# Patient Record
Sex: FEMALE | Race: OTHER | Employment: OTHER | URBAN - METROPOLITAN AREA
[De-identification: names, ages, dates, MRNs, and addresses within clinical notes are randomized per-mention and may not be internally consistent; named-entity substitution may affect disease eponyms.]

---

## 2020-12-03 NOTE — PATIENT DISCUSSION
Hyperopia Counseling: The diagnosis of hyperopia (farsightedness) was explained to the patient. Options for the correction of the patient's hyperopia which may include glasses, contacts or elective refractive surgery were discussed. Return for follow-up as scheduled. No

## 2021-06-23 ENCOUNTER — CATARACT EVALUATION (OUTPATIENT)
Dept: URBAN - METROPOLITAN AREA CLINIC 39 | Facility: CLINIC | Age: 61
End: 2021-06-23

## 2021-06-23 DIAGNOSIS — H25.811: ICD-10-CM

## 2021-06-23 DIAGNOSIS — H25.812: ICD-10-CM

## 2021-06-23 DIAGNOSIS — H40.1130: ICD-10-CM

## 2021-06-23 DIAGNOSIS — H18.513: ICD-10-CM

## 2021-06-23 PROCEDURE — 99204 OFFICE O/P NEW MOD 45 MIN: CPT

## 2021-06-23 PROCEDURE — 92250 FUNDUS PHOTOGRAPHY W/I&R: CPT

## 2021-06-23 PROCEDURE — 92136TC INTERFEROMETRY - TECHNICAL COMPONENT

## 2021-06-23 PROCEDURE — 92025-2 CORNEAL TOPOGRAPHY, PT

## 2021-06-23 PROCEDURE — 92134 CPTRZ OPH DX IMG PST SGM RTA: CPT

## 2021-06-23 PROCEDURE — V2799PMN IMPRIMIS PRED-MOXI-NEPAF 5ML

## 2021-06-23 PROCEDURE — 92286 ANT SGM IMG I&R SPECLR MIC: CPT

## 2021-06-23 PROCEDURE — 92133 CPTRZD OPH DX IMG PST SGM ON: CPT

## 2021-06-23 ASSESSMENT — VISUAL ACUITY
OD_SC: J4
OS_CC: J1
OD_CC: J1
OS_SC: J6
OD_SC: 20/50-1
OS_SC: 20/25-1

## 2021-06-23 ASSESSMENT — TONOMETRY
OD_IOP_MMHG: 12
OS_IOP_MMHG: 14

## 2021-07-15 ENCOUNTER — SURGERY/PROCEDURE (OUTPATIENT)
Dept: URBAN - METROPOLITAN AREA CLINIC 39 | Facility: CLINIC | Age: 61
End: 2021-07-15

## 2021-07-15 ENCOUNTER — ESTABLISHED PATIENT (OUTPATIENT)
Dept: URBAN - METROPOLITAN AREA SURGERY 14 | Facility: SURGERY | Age: 61
End: 2021-07-15

## 2021-07-15 VITALS
HEART RATE: 78 BPM | DIASTOLIC BLOOD PRESSURE: 88 MMHG | RESPIRATION RATE: 15 BRPM | SYSTOLIC BLOOD PRESSURE: 144 MMHG | HEIGHT: 55 IN

## 2021-07-15 DIAGNOSIS — H25.812: ICD-10-CM

## 2021-07-15 DIAGNOSIS — H25.811: ICD-10-CM

## 2021-07-15 PROCEDURE — 65772LRI LRI DURING CAT SX

## 2021-07-15 PROCEDURE — 99211HP H&P OFFICE/OUTPATIENT VISIT, EST

## 2021-07-15 PROCEDURE — 66999LNSR LENSAR LASER FOR CAT SX

## 2021-07-15 PROCEDURE — 66984AV REMOVE CATARACT, INSERT ADVANCED LENS

## 2021-07-16 ENCOUNTER — CATARACT POST-OP 1-DAY (OUTPATIENT)
Dept: URBAN - METROPOLITAN AREA CLINIC 39 | Facility: CLINIC | Age: 61
End: 2021-07-16

## 2021-07-16 DIAGNOSIS — Z96.1: ICD-10-CM

## 2021-07-16 PROCEDURE — 99024 POSTOP FOLLOW-UP VISIT: CPT

## 2021-07-16 RX ORDER — MINERAL OIL 2; 2 MG/.4ML; MG/.4ML: 1 EMULSION OPHTHALMIC

## 2021-07-16 ASSESSMENT — TONOMETRY
OD_IOP_MMHG: 17
OS_IOP_MMHG: 16

## 2021-07-16 ASSESSMENT — VISUAL ACUITY
OS_SC: 20/25+2
OD_SC: J1
OD_SC: 20/25-2
OS_SC: J3

## 2021-07-19 ENCOUNTER — POST OP/EVAL OF SECOND EYE (OUTPATIENT)
Dept: URBAN - METROPOLITAN AREA CLINIC 39 | Facility: CLINIC | Age: 61
End: 2021-07-19

## 2021-07-19 DIAGNOSIS — H40.1130: ICD-10-CM

## 2021-07-19 DIAGNOSIS — Z96.1: ICD-10-CM

## 2021-07-19 DIAGNOSIS — Z98.890: ICD-10-CM

## 2021-07-19 DIAGNOSIS — H25.812: ICD-10-CM

## 2021-07-19 DIAGNOSIS — H18.513: ICD-10-CM

## 2021-07-19 PROCEDURE — 99024 POSTOP FOLLOW-UP VISIT: CPT

## 2021-07-19 PROCEDURE — 92012 INTRM OPH EXAM EST PATIENT: CPT

## 2021-07-19 ASSESSMENT — VISUAL ACUITY
OD_SC: 20/20-1
OS_SC: 20/25-1
OS_SC: J6
OD_SC: J1+

## 2021-07-19 ASSESSMENT — TONOMETRY
OS_IOP_MMHG: 14
OD_IOP_MMHG: 14

## 2021-07-22 ENCOUNTER — FOLLOW UP (OUTPATIENT)
Dept: URBAN - METROPOLITAN AREA CLINIC 39 | Facility: CLINIC | Age: 61
End: 2021-07-22

## 2021-07-22 ENCOUNTER — SURGERY/PROCEDURE (OUTPATIENT)
Dept: URBAN - METROPOLITAN AREA CLINIC 39 | Facility: CLINIC | Age: 61
End: 2021-07-22

## 2021-07-22 ENCOUNTER — ESTABLISHED PATIENT (OUTPATIENT)
Dept: URBAN - METROPOLITAN AREA SURGERY 14 | Facility: SURGERY | Age: 61
End: 2021-07-22

## 2021-07-22 VITALS
HEIGHT: 55 IN | DIASTOLIC BLOOD PRESSURE: 88 MMHG | HEART RATE: 78 BPM | SYSTOLIC BLOOD PRESSURE: 144 MMHG | RESPIRATION RATE: 15 BRPM

## 2021-07-22 DIAGNOSIS — H25.812: ICD-10-CM

## 2021-07-22 DIAGNOSIS — H40.1130: ICD-10-CM

## 2021-07-22 DIAGNOSIS — H18.513: ICD-10-CM

## 2021-07-22 DIAGNOSIS — Z96.1: ICD-10-CM

## 2021-07-22 DIAGNOSIS — Z98.890: ICD-10-CM

## 2021-07-22 PROCEDURE — 99211HP H&P OFFICE/OUTPATIENT VISIT, EST

## 2021-07-22 PROCEDURE — 65772LRI LRI DURING CAT SX

## 2021-07-22 PROCEDURE — 66984AV REMOVE CATARACT, INSERT ADVANCED LENS

## 2021-07-22 PROCEDURE — 99211T TECH SERVICE

## 2021-07-22 PROCEDURE — 66999LNSR LENSAR LASER FOR CAT SX

## 2021-07-22 ASSESSMENT — VISUAL ACUITY
OS_SC: 20/25
OD_SC: 20/20-1

## 2021-07-23 ENCOUNTER — CATARACT POST-OP 1-DAY (OUTPATIENT)
Dept: URBAN - METROPOLITAN AREA CLINIC 39 | Facility: CLINIC | Age: 61
End: 2021-07-23

## 2021-07-23 DIAGNOSIS — Z96.1: ICD-10-CM

## 2021-07-23 PROCEDURE — 99024 POSTOP FOLLOW-UP VISIT: CPT

## 2021-07-23 ASSESSMENT — VISUAL ACUITY
OD_SC: 20/20
OU_SC: 20/20-1
OS_SC: 20/50-2
OD_SC: J1+
OU_SC: J1+
OS_SC: J1

## 2021-07-23 ASSESSMENT — TONOMETRY
OD_IOP_MMHG: 18
OS_IOP_MMHG: 18

## 2021-07-31 NOTE — PATIENT DISCUSSION
Hyperopia Counseling: The diagnosis of hyperopia (farsightedness) was explained to the patient. Options for the correction of the patient's hyperopia which may include glasses, contacts or elective refractive surgery were discussed. Return for follow-up as scheduled. eye

## 2021-08-27 ENCOUNTER — POST-OP CATARACT (OUTPATIENT)
Dept: URBAN - METROPOLITAN AREA CLINIC 39 | Facility: CLINIC | Age: 61
End: 2021-08-27

## 2021-08-27 DIAGNOSIS — Z96.1: ICD-10-CM

## 2021-08-27 PROCEDURE — 99024 POSTOP FOLLOW-UP VISIT: CPT

## 2021-08-27 ASSESSMENT — TONOMETRY
OD_IOP_MMHG: 11
OS_IOP_MMHG: 11

## 2021-08-27 ASSESSMENT — VISUAL ACUITY
OD_SC: 20/20-2
OU_SC: J1+
OS_SC: 20/20-2
OS_SC: J1
OD_SC: J1+
OU_SC: 20/20-1

## 2022-01-11 ENCOUNTER — COMPREHENSIVE EXAM (OUTPATIENT)
Dept: URBAN - METROPOLITAN AREA CLINIC 39 | Facility: CLINIC | Age: 62
End: 2022-01-11

## 2022-01-11 DIAGNOSIS — Z98.890: ICD-10-CM

## 2022-01-11 DIAGNOSIS — H40.1130: ICD-10-CM

## 2022-01-11 DIAGNOSIS — H26.493: ICD-10-CM

## 2022-01-11 DIAGNOSIS — Z96.1: ICD-10-CM

## 2022-01-11 DIAGNOSIS — H18.513: ICD-10-CM

## 2022-01-11 PROCEDURE — 92015 DETERMINE REFRACTIVE STATE: CPT

## 2022-01-11 PROCEDURE — 92014 COMPRE OPH EXAM EST PT 1/>: CPT

## 2022-01-11 PROCEDURE — 92133 CPTRZD OPH DX IMG PST SGM ON: CPT

## 2022-01-11 ASSESSMENT — VISUAL ACUITY
OD_SC: J1+
OD_SC: 20/20
OS_SC: J1+
OS_SC: 20/20-1

## 2022-01-11 ASSESSMENT — TONOMETRY
OS_IOP_MMHG: 14
OD_IOP_MMHG: 14

## 2022-07-15 ENCOUNTER — FOLLOW UP (OUTPATIENT)
Dept: URBAN - METROPOLITAN AREA CLINIC 39 | Facility: CLINIC | Age: 62
End: 2022-07-15

## 2022-07-15 DIAGNOSIS — H26.493: ICD-10-CM

## 2022-07-15 DIAGNOSIS — Z98.890: ICD-10-CM

## 2022-07-15 DIAGNOSIS — H40.1130: ICD-10-CM

## 2022-07-15 PROCEDURE — 92012 INTRM OPH EXAM EST PATIENT: CPT

## 2022-07-15 ASSESSMENT — VISUAL ACUITY
OU_SC: 20/20-1
OU_SC: J1+
OD_SC: 20/20-2
OS_SC: 20/30+2

## 2022-07-15 ASSESSMENT — TONOMETRY
OS_IOP_MMHG: 13
OS_IOP_MMHG: 14
OD_IOP_MMHG: 14

## 2022-09-07 ENCOUNTER — TECH ONLY (OUTPATIENT)
Dept: URBAN - METROPOLITAN AREA CLINIC 39 | Facility: CLINIC | Age: 62
End: 2022-09-07

## 2022-09-07 DIAGNOSIS — H26.493: ICD-10-CM

## 2022-09-07 DIAGNOSIS — H40.1130: ICD-10-CM

## 2022-09-07 DIAGNOSIS — Z98.890: ICD-10-CM

## 2022-09-07 PROCEDURE — 92083 EXTENDED VISUAL FIELD XM: CPT

## 2022-09-07 PROCEDURE — 99211T TECH SERVICE

## 2023-01-20 ENCOUNTER — COMPREHENSIVE EXAM (OUTPATIENT)
Dept: URBAN - METROPOLITAN AREA CLINIC 39 | Facility: CLINIC | Age: 63
End: 2023-01-20

## 2023-01-20 DIAGNOSIS — H40.1131: ICD-10-CM

## 2023-01-20 DIAGNOSIS — Z98.890: ICD-10-CM

## 2023-01-20 DIAGNOSIS — H18.513: ICD-10-CM

## 2023-01-20 DIAGNOSIS — H26.493: ICD-10-CM

## 2023-01-20 DIAGNOSIS — D31.31: ICD-10-CM

## 2023-01-20 DIAGNOSIS — H02.88B: ICD-10-CM

## 2023-01-20 DIAGNOSIS — Z96.1: ICD-10-CM

## 2023-01-20 DIAGNOSIS — H02.88A: ICD-10-CM

## 2023-01-20 DIAGNOSIS — H04.123: ICD-10-CM

## 2023-01-20 PROCEDURE — 92014 COMPRE OPH EXAM EST PT 1/>: CPT

## 2023-01-20 PROCEDURE — 92250 FUNDUS PHOTOGRAPHY W/I&R: CPT

## 2023-01-20 PROCEDURE — 92015 DETERMINE REFRACTIVE STATE: CPT

## 2023-01-20 ASSESSMENT — VISUAL ACUITY
OS_SC: J1+
OD_SC: J1+
OD_SC: 20/20
OU_SC: 20/20-1
OS_SC: 20/25-1/+2
OU_SC: J1+

## 2023-01-20 ASSESSMENT — TONOMETRY
OS_IOP_MMHG: 13
OD_IOP_MMHG: 12

## 2023-07-03 ENCOUNTER — FOLLOW UP (OUTPATIENT)
Dept: URBAN - METROPOLITAN AREA CLINIC 39 | Facility: CLINIC | Age: 63
End: 2023-07-03

## 2023-07-03 DIAGNOSIS — H43.392: ICD-10-CM

## 2023-07-03 DIAGNOSIS — H04.123: ICD-10-CM

## 2023-07-03 DIAGNOSIS — Z98.890: ICD-10-CM

## 2023-07-03 DIAGNOSIS — H40.1131: ICD-10-CM

## 2023-07-03 PROCEDURE — 92083 EXTENDED VISUAL FIELD XM: CPT

## 2023-07-03 PROCEDURE — 92012 INTRM OPH EXAM EST PATIENT: CPT

## 2023-07-03 ASSESSMENT — TONOMETRY
OD_IOP_MMHG: 16
OS_IOP_MMHG: 14
OS_IOP_MMHG: 13
OD_IOP_MMHG: 15

## 2023-07-03 ASSESSMENT — VISUAL ACUITY
OD_SC: 20/25-1
OS_SC: 20/20
OU_SC: 20/20

## 2024-01-05 ENCOUNTER — COMPREHENSIVE EXAM (OUTPATIENT)
Dept: URBAN - METROPOLITAN AREA CLINIC 39 | Facility: CLINIC | Age: 64
End: 2024-01-05

## 2024-01-05 DIAGNOSIS — H26.493: ICD-10-CM

## 2024-01-05 DIAGNOSIS — H02.88B: ICD-10-CM

## 2024-01-05 DIAGNOSIS — H40.1131: ICD-10-CM

## 2024-01-05 DIAGNOSIS — H43.392: ICD-10-CM

## 2024-01-05 DIAGNOSIS — H04.123: ICD-10-CM

## 2024-01-05 DIAGNOSIS — H02.88A: ICD-10-CM

## 2024-01-05 DIAGNOSIS — Z98.890: ICD-10-CM

## 2024-01-05 DIAGNOSIS — H18.513: ICD-10-CM

## 2024-01-05 DIAGNOSIS — D31.31: ICD-10-CM

## 2024-01-05 DIAGNOSIS — Z96.1: ICD-10-CM

## 2024-01-05 PROCEDURE — 92250 FUNDUS PHOTOGRAPHY W/I&R: CPT

## 2024-01-05 PROCEDURE — 92015 DETERMINE REFRACTIVE STATE: CPT

## 2024-01-05 PROCEDURE — 92014 COMPRE OPH EXAM EST PT 1/>: CPT

## 2024-01-05 ASSESSMENT — TONOMETRY
OD_IOP_MMHG: 14
OS_IOP_MMHG: 13

## 2024-01-05 ASSESSMENT — VISUAL ACUITY
OS_SC: 20/20
OS_SC: J1
OD_SC: J3
OD_SC: 20/20-2
OU_SC: 20/20
OU_SC: J1+

## 2024-02-08 ENCOUNTER — SURGERY/PROCEDURE (OUTPATIENT)
Dept: URBAN - METROPOLITAN AREA SURGERY 14 | Facility: SURGERY | Age: 64
End: 2024-02-08

## 2024-02-08 ENCOUNTER — ESTABLISHED PATIENT (OUTPATIENT)
Dept: URBAN - METROPOLITAN AREA CLINIC 39 | Facility: CLINIC | Age: 64
End: 2024-02-08

## 2024-02-08 DIAGNOSIS — H26.493: ICD-10-CM

## 2024-02-08 PROCEDURE — 99214 OFFICE O/P EST MOD 30 MIN: CPT

## 2024-02-08 PROCEDURE — 6682150 YAG CAPSULOTOMY

## 2024-02-08 ASSESSMENT — TONOMETRY
OD_IOP_MMHG: 11
OS_IOP_MMHG: 11

## 2024-02-08 ASSESSMENT — VISUAL ACUITY
OD_SC: J4
OS_BAT: 20/50
OS_SC: 20/20-2
OD_SC: 20/20-1
OS_SC: J1
OD_BAT: 20/70

## 2024-03-12 ENCOUNTER — POST-OP (OUTPATIENT)
Dept: URBAN - METROPOLITAN AREA CLINIC 39 | Facility: CLINIC | Age: 64
End: 2024-03-12

## 2024-03-12 DIAGNOSIS — Z98.890: ICD-10-CM

## 2024-03-12 PROCEDURE — 99024 POSTOP FOLLOW-UP VISIT: CPT

## 2024-03-12 ASSESSMENT — VISUAL ACUITY
OU_SC: 20/15-1
OD_SC: 20/20-1
OS_SC: J1+
OU_SC: J1+
OS_SC: 20/20-1
OD_SC: J1+

## 2024-03-12 ASSESSMENT — TONOMETRY
OS_IOP_MMHG: 14
OD_IOP_MMHG: 14

## 2025-01-02 ENCOUNTER — FOLLOW UP (OUTPATIENT)
Age: 65
End: 2025-01-02

## 2025-01-02 DIAGNOSIS — Z98.890: ICD-10-CM

## 2025-01-02 DIAGNOSIS — H40.1131: ICD-10-CM

## 2025-01-02 DIAGNOSIS — H04.123: ICD-10-CM

## 2025-01-02 PROCEDURE — 76514 ECHO EXAM OF EYE THICKNESS: CPT

## 2025-01-02 PROCEDURE — 92250 FUNDUS PHOTOGRAPHY W/I&R: CPT

## 2025-01-02 PROCEDURE — 92012 INTRM OPH EXAM EST PATIENT: CPT

## 2025-01-02 PROCEDURE — 92083 EXTENDED VISUAL FIELD XM: CPT
